# Patient Record
Sex: MALE | URBAN - METROPOLITAN AREA
[De-identification: names, ages, dates, MRNs, and addresses within clinical notes are randomized per-mention and may not be internally consistent; named-entity substitution may affect disease eponyms.]

---

## 2017-07-26 NOTE — PATIENT DISCUSSION
Cataract symptoms i.e., glare, blur discussed. Pt to call if worsening vision or trouble with driving, TV, reading, ADL. UV precautions. Reviewed possibility of future cataract surgery.

## 2017-07-31 ENCOUNTER — IMPORTED ENCOUNTER (OUTPATIENT)
Dept: URBAN - METROPOLITAN AREA CLINIC 1 | Facility: CLINIC | Age: 52
End: 2017-07-31

## 2017-07-31 PROBLEM — H52.4: Noted: 2017-07-31

## 2017-07-31 PROBLEM — H52.03: Noted: 2017-07-31

## 2017-07-31 NOTE — PATIENT DISCUSSION
Presbyopia/ Hyperopia: Advised pt not a good candidate for refractive sx at this time second to cannot correct near vision. Advised pt could consider bifocal contact lenses.

## 2020-02-10 NOTE — PROCEDURE NOTE: SURGICAL
MR #: [id number]<GZ />  <br />  PREOPERATIVE DIAGNOSIS: Cataract, right eye.<br />  <br />  POSTOPERATIVE DIAGNOSIS: Same.<br />  <br />  OPERATIVE PROCEDURE: Phacoemulsification with +18.0 diopter Mahamed &amp; Mahamed AAB00, PC-IOL, right eye.<br />  <br />  PROCEDURE:&nbsp; Following sedation, David Arevalo CRNA, administered topical anesthesia in the form of lidocaine 2% gel as per the anesthetic record. <br />  <br />  Prior to commencing surgery patient identification, surgical procedure, site, and side were confirmed by Dr. Angel Schumacher. &nbsp; The patient was then prepped with Betadine and draped with sterile drapes. A lid speculum was placed and the side port incision prepared. &nbsp; 0.5 cc of Epi-Shugarcaine was instilled and the anterior chamber entered at the temporal 180° limbus in a single plane fashion employing a 2.7 mm trapezoid amber and ring fixation. Viscoelastic was placed, a circular capsulorhexis performed, hydrodissection accomplished and the nucleus emulsified within the posterior chamber. Cortex was aspirated with the I/A handpiece, the posterior capsule polished and viscoelastic instilled to distend the capsular sac. A +18.0 diopter Mahamed &amp; Burnice Silver Plume was placed into the bag under direct visualization without difficulty employing the microinjector. Viscoelastic was aspirated with the I/A handpiece and the anterior chamber pressurized with BSS. The incision was tested for leaks and none were found. A single injection of 0.2 cc of Moxifloxacin was placed in the anterior chamber. The patient returned to the holding area having tolerated the procedure extremely well and without complication. <br />  <br />  Epi-shugarcaine consists of a mixture of 1cc epinephrine MPF 1:1000, 0.75 cc 4% lidocaine MPF and 2.25 cc BSS. Moxifloxacin consists of a mixture of Vigamox and BSS 1 mg/1 ml solution. <br />  <br />  <br />

## 2020-02-11 NOTE — PATIENT DISCUSSION
Cataract surgery has been performed in the first eye and activities of daily living are still impaired. The patient would like to proceed with cataract surgery in the second eye as scheduled. The patient elects Basic IOL OS, goal of emmetropia.

## 2020-02-17 NOTE — PROCEDURE NOTE: SURGICAL
MR #: [id number]<EQ />  <br />  PREOPERATIVE DIAGNOSIS: Cataract, left eye.<br />  <br />  POSTOPERATIVE DIAGNOSIS: Same.<br />  <br />  OPERATIVE PROCEDURE: Phacoemulsification with +18.0 diopter Mahamed &amp; Mahamed AAB00, PC-IOL, left eye.<br />  <br />  PROCEDURE:&nbsp; Following sedation, Evelyne Wren CRNA, administered topical anesthesia in the form of lidocaine 2% gel as per the anesthetic record. <br />  <br />  Prior to commencing surgery patient identification, surgical procedure, site, and side were confirmed by Dr. Seema Del Castillo. &nbsp; The patient was then prepped with Betadine and draped with sterile drapes. A lid speculum was placed and the side port incision prepared. &nbsp; 0.5 cc of Epi-Shugarcaine was instilled and the anterior chamber entered at the temporal 180° limbus in a single plane fashion employing a 2.7 mm trapezoid amber and ring fixation. Viscoelastic was placed, a circular capsulorhexis performed, hydrodissection accomplished and the nucleus emulsified within the posterior chamber. Cortex was aspirated with the I/A handpiece, the posterior capsule polished and viscoelastic instilled to distend the capsular sac. A +18.0 diopter Mahamed &amp; Katherleen Binder was placed into the bag under direct visualization without difficulty employing the microinjector. Viscoelastic was aspirated with the I/A handpiece and the anterior chamber pressurized with BSS. The incision was tested for leaks and none were found. A single injection of 0.2 cc of Moxifloxacin was placed in the anterior chamber. The patient returned to the holding area having tolerated the procedure extremely well and without complication. <br />  <br />  Epi-shugarcaine consists of a mixture of 1cc epinephrine MPF 1:1000, 0.75 cc 4% lidocaine MPF and 2.25 cc BSS. Moxifloxacin consists of a mixture of Vigamox and BSS 1 mg/1 ml solution. <br />  <br />  <br />

## 2020-02-18 NOTE — PATIENT DISCUSSION
1 day PO: Patient is doing well post-operatively. The importance of post-op drop compliance was emphasized. Drop schedule reviewed with patient. Patient to call if any visual changes or concerns. home

## 2022-04-02 ASSESSMENT — VISUAL ACUITY
OS_CC: 20/20
OD_CC: 20/20
OS_CC: J1
OD_CC: J1
OS_SC: 20/20
OD_SC: 20/20-1

## 2023-12-12 NOTE — PATIENT DISCUSSION
Problem: PAIN - ADULT  Goal: Verbalizes/displays adequate comfort level or baseline comfort level  Description: Interventions:  - Encourage patient to monitor pain and request assistance  - Assess pain using appropriate pain scale  - Administer analgesics based on type and severity of pain and evaluate response  - Implement non-pharmacological measures as appropriate and evaluate response  - Consider cultural and social influences on pain and pain management  - Notify physician/advanced practitioner if interventions unsuccessful or patient reports new pain  Outcome: Progressing     Problem: INFECTION - ADULT  Goal: Absence or prevention of progression during hospitalization  Description: INTERVENTIONS:  - Assess and monitor for signs and symptoms of infection  - Monitor lab/diagnostic results  - Monitor all insertion sites, i.e. indwelling lines, tubes, and drains  - Monitor endotracheal if appropriate and nasal secretions for changes in amount and color  - [place] appropriate cooling/warming therapies per order  - Administer medications as ordered  - Instruct and encourage patient and family to use good hand hygiene technique  - Identify and instruct in appropriate isolation precautions for identified infection/condition  Outcome: Progressing     Problem: DISCHARGE PLANNING  Goal: Discharge to home or other facility with appropriate resources  Description: INTERVENTIONS:  - Identify barriers to discharge w/patient and caregiver  - Arrange for needed discharge resources and transportation as appropriate  - Identify discharge learning needs (meds, wound care, etc.)  - Arrange for interpretive services to assist at discharge as needed  - Refer to Case Management Department for coordinating discharge planning if the patient needs post-hospital services based on physician/advanced practitioner order or complex needs related to functional status, cognitive ability, or social support system  Outcome: Progressing    Patient understands condition, prognosis and need for follow up care.   Problem: Knowledge Deficit  Goal: Patient/family/caregiver demonstrates understanding of disease process, treatment plan, medications, and discharge instructions  Description: Complete learning assessment and assess knowledge base.  Interventions:  - Provide teaching at level of understanding  - Provide teaching via preferred learning methods  Outcome: Progressing